# Patient Record
Sex: FEMALE
[De-identification: names, ages, dates, MRNs, and addresses within clinical notes are randomized per-mention and may not be internally consistent; named-entity substitution may affect disease eponyms.]

---

## 2019-03-13 ENCOUNTER — HOSPITAL ENCOUNTER (INPATIENT)
Dept: HOSPITAL 31 - C.ER | Age: 69
LOS: 6 days | Discharge: HOME | DRG: 761 | End: 2019-03-19
Attending: INTERNAL MEDICINE | Admitting: INTERNAL MEDICINE
Payer: MEDICAID

## 2019-03-13 VITALS — BODY MASS INDEX: 28.3 KG/M2
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
## 2019-03-13 DIAGNOSIS — Z79.84: ICD-10-CM

## 2019-03-13 DIAGNOSIS — E78.00: ICD-10-CM

## 2019-03-13 DIAGNOSIS — Z91.83: ICD-10-CM
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
## 2019-03-13 DIAGNOSIS — Z88.0: ICD-10-CM

## 2019-03-13 DIAGNOSIS — Z83.3: ICD-10-CM

## 2019-03-13 DIAGNOSIS — S00.81XA: ICD-10-CM

## 2019-03-13 DIAGNOSIS — F02.80: ICD-10-CM

## 2019-03-13 DIAGNOSIS — Z91.81: ICD-10-CM

## 2019-03-13 DIAGNOSIS — E78.5: ICD-10-CM

## 2019-03-13 DIAGNOSIS — S06.5X9A: Primary | ICD-10-CM
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
## 2019-03-13 DIAGNOSIS — E11.9: ICD-10-CM

## 2019-03-13 DIAGNOSIS — G30.9: ICD-10-CM

## 2019-03-13 DIAGNOSIS — Z86.19: ICD-10-CM

## 2019-03-13 DIAGNOSIS — T68.XXXA: ICD-10-CM

## 2019-03-13 DIAGNOSIS — E89.2: ICD-10-CM

## 2019-03-13 LAB
ALBUMIN SERPL-MCNC: 4.5 G/DL (ref 3.5–5)
ALBUMIN/GLOB SERPL: 1.1 {RATIO} (ref 1–2.1)
ALT SERPL-CCNC: 15 U/L (ref 9–52)
APTT BLD: 27 SECONDS (ref 21–34)
AST SERPL-CCNC: 36 U/L (ref 14–36)
BACTERIA #/AREA URNS HPF: (no result) /[HPF]
BASE EXCESS BLDV CALC-SCNC: -12.4 MMOL/L (ref 0–2)
BASOPHILS # BLD AUTO: 0 K/UL (ref 0–0.2)
BASOPHILS NFR BLD: 0.2 % (ref 0–2)
BILIRUB UR-MCNC: NEGATIVE MG/DL
BUN SERPL-MCNC: 14 MG/DL (ref 7–17)
CALCIUM SERPL-MCNC: 9.8 MG/DL (ref 8.6–10.4)
EOSINOPHIL # BLD AUTO: 0 K/UL (ref 0–0.7)
EOSINOPHIL NFR BLD: 0.2 % (ref 0–4)
ERYTHROCYTE [DISTWIDTH] IN BLOOD BY AUTOMATED COUNT: 15.5 % (ref 11.5–14.5)
GFR NON-AFRICAN AMERICAN: > 60
GLUCOSE UR STRIP-MCNC: NORMAL MG/DL
HGB BLD-MCNC: 13.3 G/DL (ref 11–16)
INR PPP: 1.1
LEUKOCYTE ESTERASE UR-ACNC: (no result) LEU/UL
LIPASE: 121 U/L (ref 23–300)
LYMPHOCYTES # BLD AUTO: 1.9 K/UL (ref 1–4.3)
LYMPHOCYTES NFR BLD AUTO: 12 % (ref 20–40)
MCH RBC QN AUTO: 27.4 PG (ref 27–31)
MCHC RBC AUTO-ENTMCNC: 31.4 G/DL (ref 33–37)
MCV RBC AUTO: 87.2 FL (ref 81–99)
MONOCYTES # BLD: 0.6 K/UL (ref 0–0.8)
MONOCYTES NFR BLD: 3.5 % (ref 0–10)
NEUTROPHILS # BLD: 13.5 K/UL (ref 1.8–7)
NEUTROPHILS NFR BLD AUTO: 84.1 % (ref 50–75)
NRBC BLD AUTO-RTO: 0 % (ref 0–2)
PCO2 BLDV: 31 MMHG (ref 40–60)
PH BLDV: 7.25 [PH] (ref 7.32–7.43)
PH UR STRIP: 5 [PH] (ref 5–8)
PLATELET # BLD: 321 K/UL (ref 130–400)
PMV BLD AUTO: 7.9 FL (ref 7.2–11.7)
PROT UR STRIP-MCNC: NEGATIVE MG/DL
PROTHROMBIN TIME: 11.5 SECONDS (ref 9.7–12.2)
RBC # BLD AUTO: 4.86 MIL/UL (ref 3.8–5.2)
RBC # UR STRIP: NEGATIVE /UL
SP GR UR STRIP: 1.01 (ref 1–1.03)
SQUAMOUS EPITHIAL: < 1 /HPF (ref 0–5)
UROBILINOGEN UR-MCNC: NORMAL MG/DL (ref 0.2–1)
VENOUS BLOOD GAS PO2: 44 MM/HG (ref 30–55)
WBC # BLD AUTO: 16.1 K/UL (ref 4.8–10.8)

## 2019-03-13 RX ADMIN — HUMAN INSULIN SCH: 100 INJECTION, SOLUTION SUBCUTANEOUS at 22:43

## 2019-03-13 NOTE — CP.PCM.PN
Subjective





- Date & Time of Evaluation


Date of Evaluation: 03/13/19


Time of Evaluation: 13:55





- Subjective


Subjective: 





67 yo female with hx dementia


had fall


ct this am showed small amount of blood layoring on right tent and falx


repeat shows no significant change


Pt will not require any surgical intervention 


further manangemtas per neurology





Objective





- Vital Signs/Intake and Output


Vital Signs (last 24 hours): 


                                        











Temp Pulse Resp BP Pulse Ox


 


 97.6 F   83   18   155/67 H  100 


 


 03/13/19 08:24  03/13/19 09:25  03/13/19 09:25  03/13/19 09:25  03/13/19 09:25








Intake and Output: 


                                        











 03/13/19 03/13/19





 06:59 18:59


 


Output Total  380


 


Balance  -380














- Labs


Labs: 


                                        





                                 03/13/19 06:22 





                                 03/13/19 06:22 





                                        











PT  11.5 SECONDS (9.7-12.2)   03/13/19  06:22    


 


INR  1.1   03/13/19  06:22    


 


APTT  27 SECONDS (21-34)   03/13/19  06:22

## 2019-03-13 NOTE — CARD
--------------- APPROVED REPORT --------------





Date of service: 03/13/2019



EKG Measurement

Heart [id number]

NV [id number]

[id number]

[id number]

[id number]



<Conclusion>

Normal sinus rhythm

Moderate voltage criteria for LVH, may be normal variant

ST & T wave abnormality, consider anterolateral ischemia

Prolonged QT

Abnormal ECG

## 2019-03-13 NOTE — CP.PCM.CON
<Alverto Rodarte - Last Filed: 03/13/19 18:24>





History of Present Illness





- History of Present Illness


History of Present Illness: 


Consult Note for ICU for Dr. Gutiérrez





This is a 68 y o female with PMhx hypothyroidism, dementia, HLD, DM2 who 

presented to the ED brought in by police after being found outside near a tree. 

Pt was confused at time of ED presentation, and unable to provide further hx or 

ROS. Per pt's daughter-in-law she states that pt has been living at home with 

pt's daughter and family, and that they were unable to find patient this am. 

Pt's grandson at bedside states that he woke up at 5 am this morning and noticed

that pt had ran away from home. Unknown amount of time pt was outside of house 

before being found. States they went to police station this am to report her 

missing and they were told that she was being treated at [facility]. 

Reports pt has been living at home with them for the past 2 years, having 

previously lived in the [place]. Reports prior times she left home but states they

usually place alarms on to notify if she tries to leave home, but that the 

alarms did not go off this time. Pt on exam currently denies any acute 

complaints except for the skin excoriations sustained on her face that she 

presented with on admission, unknown mechanism of injury. Unable to obtain 

further ROS due to pt's current mental status. Reason for ICU consult was for s

ubdural hematoma. 





PMhx: hypothyroidism, dementia, HLD, DM2


PSurgHx: thyroidectomy < 1 y ago


Allergies: PCN (unknown reaction)


Home meds: reviewed as per MAR


[facility] hx: denies


Soc hx: denies smoking, EtOH or illicit drug use





PMD: Dr. Shanks ([place])





Review of Systems





- Review of Systems


Systems not reviewed;Unavailable: Altered Mental Status





Past Patient History





- Infectious Disease


Hx of Infectious Diseases: None





- Past Medical History & Family History


Past Medical History?: Yes





- Past Social History


Smoking Status: Unknown If Ever Smoked





- CARDIAC


Hx Hypercholesterolemia: Yes





- PULMONARY


Hx Respiratory Disorders: No





- NEUROLOGICAL


Hx Neurological Disorder: Yes


Hx Alzheimer's Disease: Yes


Hx Dementia: Yes





- HEENT


Hx HEENT Problems: No





- RENAL


Hx Chronic Kidney Disease: No





- ENDOCRINE/METABOLIC


Hx Endocrine Disorders: Yes


Hx Diabetes Mellitus Type 2: Yes


Hx Hypothyroidism: Yes





- HEMATOLOGICAL/ONCOLOGICAL


Hx Blood Disorders: No





- INTEGUMENTARY


Hx Dermatological Problems: No





- MUSCULOSKELETAL/RHEUMATOLOGICAL


Hx Musculoskeletal Disorders: No


Hx Falls: Yes


Hx Unsteady Gait: Yes





- GASTROINTESTINAL


Hx Gastrointestinal Disorders: No





- GENITOURINARY/GYNECOLOGICAL


Hx Genitourinary Disorders: No





- PSYCHIATRIC


Hx Psychophysiologic Disorder: No


Hx Substance Use: No





- SURGICAL HISTORY


Hx Surgeries: Yes


Hx Parathyroidectomy: Yes





- ANESTHESIA


Hx Anesthesia: Yes


Hx Anesthesia Reactions: No


Hx Malignant Hyperthermia: No


Has any member of the family had a problem w/ anesthesia?: No





Meds


Allergies/Adverse Reactions: 


                                    Allergies











Allergy/AdvReac Type Severity Reaction Status Date / Time


 


Penicillins Allergy   Verified 03/13/19 06:40














Physical Exam





- Constitutional


Appears: Non-toxic, No Acute Distress





- Head Exam


Head Exam: ATRAUMATIC, NORMOCEPHALIC





- Eye Exam


Eye Exam: EOMI, Normal appearance, PERRL





- ENT Exam


ENT Exam: Mucous Membranes Moist





- Respiratory Exam


Respiratory Exam: Clear to Auscultation Bilateral, NORMAL BREATHING PATTERN.  

absent: Rales, Rhonchi, Wheezes





- Cardiovascular Exam


Cardiovascular Exam: REGULAR RHYTHM, +S1, +S2.  absent: Gallop, Rubs, Systolic 

Murmur





- GI/Abdominal Exam


GI & Abdominal Exam: Normal Bowel Sounds, Soft.  absent: Distended, 

Organomegaly, Tenderness





- Extremities Exam


Extremities exam: Positive for: normal capillary refill, normal inspection, 

pedal pulses present.  Negative for: pedal edema





- Neurological Exam


Neurological exam: Alert


Additional comments: 


AAOx2





- Skin


Skin Exam: Dry, Intact, Warm


Additional comments: 


Excoriations present on face, dry, no active bleeding





Results





- Vital Signs


Recent Vital Signs: 


                                Last Vital Signs











Temp  97.6 F   03/13/19 08:24


 


Pulse  83   03/13/19 09:25


 


Resp  18   03/13/19 09:25


 


BP  155/67 H  03/13/19 09:25


 


Pulse Ox  100   03/13/19 09:25














- Labs


Result Diagrams: 


                                 03/13/19 06:22





                                 03/13/19 06:22


Labs: 


                         Laboratory Results - last 24 hr











  03/13/19 03/13/19 03/13/19





  06:05 06:20 06:22


 


WBC    16.1 H


 


RBC    4.86


 


Hgb    13.3


 


Hct    42.4


 


MCV    87.2


 


MCH    27.4


 


MCHC    31.4 L


 


RDW    15.5 H


 


Plt Count    321


 


MPV    7.9


 


Neut % (Auto)    84.1 H


 


Lymph % (Auto)    12.0 L


 


Mono % (Auto)    3.5


 


Eos % (Auto)    0.2


 


Baso % (Auto)    0.2


 


Neut # (Auto)    13.5 H


 


Lymph # (Auto)    1.9


 


Mono # (Auto)    0.6


 


Eos # (Auto)    0.0


 


Baso # (Auto)    0.0


 


PT   


 


INR   


 


APTT   


 


pO2   44 


 


VBG pH   7.25 L 


 


VBG pCO2   31 L 


 


VBG HCO3   14.5 


 


VBG Total CO2   14.6 L 


 


VBG O2 Sat (Calc)   79.7 H 


 


VBG Base Excess   -12.4 L 


 


VBG Potassium   2.4 L* 


 


Sodium   143.0 


 


Chloride   121.0 H 


 


Glucose   104 


 


Lactate   3.6 H 


 


Blood Gas Comments   K value 2.4 


 


Crit Value Called To    


 


Crit Value Called By   Araon yip 


 


Crit Value Read Back   Y 


 


Blood Gas Notified Time   632 


 


Potassium   


 


Carbon Dioxide   


 


Anion Gap   


 


BUN   


 


Creatinine   


 


Est GFR ( Amer)   


 


Est GFR (Non-Af Amer)   


 


POC Glucose (mg/dL)  164 H  


 


Random Glucose   


 


Calcium   


 


Magnesium   


 


Total Bilirubin   


 


AST   


 


ALT   


 


Alkaline Phosphatase   


 


Total Protein   


 


Albumin   


 


Globulin   


 


Albumin/Globulin Ratio   


 


Lipase   


 


Venous Blood Potassium   2.4 L* 


 


Urine Color   


 


Urine Clarity   


 


Urine pH   


 


Ur Specific Gravity   


 


Urine Protein   


 


Urine Glucose (UA)   


 


Urine Ketones   


 


Urine Blood   


 


Urine Nitrate   


 


Urine Bilirubin   


 


Urine Urobilinogen   


 


Ur Leukocyte Esterase   


 


Urine WBC (Auto)   


 


Urine RBC (Auto)   


 


Ur Squamous Epith Cells   


 


Urine Bacteria   














  03/13/19 03/13/19 03/13/19





  06:22 06:22 10:12


 


WBC   


 


RBC   


 


Hgb   


 


Hct   


 


MCV   


 


MCH   


 


MCHC   


 


RDW   


 


Plt Count   


 


MPV   


 


Neut % (Auto)   


 


Lymph % (Auto)   


 


Mono % (Auto)   


 


Eos % (Auto)   


 


Baso % (Auto)   


 


Neut # (Auto)   


 


Lymph # (Auto)   


 


Mono # (Auto)   


 


Eos # (Auto)   


 


Baso # (Auto)   


 


PT  11.5  


 


INR  1.1  


 


APTT  27  


 


pO2   


 


VBG pH   


 


VBG pCO2   


 


VBG HCO3   


 


VBG Total CO2   


 


VBG O2 Sat (Calc)   


 


VBG Base Excess   


 


VBG Potassium   


 


Sodium   140 


 


Chloride   104 


 


Glucose   


 


Lactate   


 


Blood Gas Comments   


 


Crit Value Called To   


 


Crit Value Called By   


 


Crit Value Read Back   


 


Blood Gas Notified Time   


 


Potassium   4.5 


 


Carbon Dioxide   23 


 


Anion Gap   17 


 


BUN   14 


 


Creatinine   0.7 


 


Est GFR ( Amer)   > 60 


 


Est GFR (Non-Af Amer)   > 60 


 


POC Glucose (mg/dL)   


 


Random Glucose   162 H 


 


Calcium   9.8 


 


Magnesium   1.8 


 


Total Bilirubin   0.5 


 


AST   36 


 


ALT   15 


 


Alkaline Phosphatase   175 H 


 


Total Protein   8.4 H 


 


Albumin   4.5 


 


Globulin   4.0 H 


 


Albumin/Globulin Ratio   1.1 


 


Lipase   121 


 


Venous Blood Potassium   


 


Urine Color    Yellow


 


Urine Clarity    Clear


 


Urine pH    5.0


 


Ur Specific Gravity    1.009


 


Urine Protein    Negative


 


Urine Glucose (UA)    Normal


 


Urine Ketones    Negative


 


Urine Blood    Negative


 


Urine Nitrate    Positive H


 


Urine Bilirubin    Negative


 


Urine Urobilinogen    Normal


 


Ur Leukocyte Esterase    Trace


 


Urine WBC (Auto)    3


 


Urine RBC (Auto)    1


 


Ur Squamous Epith Cells    < 1


 


Urine Bacteria    Occ H














Assessment & Plan





- Assessment and Plan (Free Text)


Assessment: 


This is a 68 y o female with PMhx hypothyroidism, dementia, HLD, DM2 who 

presented to the ED brought in by police after being found outside near a tree. 

Reason for ICU consult was for subdural hematoma. 


Plan: 


Neuro:


-AAOx2


-CT head on admission: acute subdural hematoma along R tentorial leaflet with 

maximum thickness of 0.2 cm.


-Repeat head CT: interval slight increased the size of the acute subdural 

hematoma along the R tentorial leaflet since the prior exam. Otherwise no 

significant interval changes


-Neurosurgery consulted (Dr. Pierce), recs appreciated


   No surgical intervention recommended at this time


-Neurology consulted (Dr. Zhang), recs appreciated


-Hx dementia, cont w/ home med Donepezil





Cardio: 


-HR and BP wnl, pt asymptomatic, cont to monitor


-C/w home med Lisinopril daily


-C/w home med statin therapy


-EKG on admission demonstrates NSR, possible LVH, prolonged QT


-CXR on admission: no focal consolidation





GI:


-Diabetic diet


-Protonix daily


-No acute issues at this time





Renal:


-BUN/Cr 14/0.7, cont to monitor


-Trend I's/O's





ID:


-Leukocytosis 16.1


-Afebrile, no tachycardia


-Cont to trend, no anbx indicated at this time


-Blood and urine cxs pending


-CXR neg for consolidation





Endo:


-Hx DM2


   Cont with home med Metformin


   ISS-med


   Fingersticks achs


   Hypoglycemic protocol


   A1c pending


-Hx hypothyroidism s/p thyroidectomy


   C/w home med Synthroid


   TSH, free T3 and T4 pending





PPX:


-Protonix


-SCD, pharmacologic DVT ppx contraindicated 2/2 cranial bleed


-OT/PT evals ordered





Pt seen, examined with, and plan discussed with Dr. Gutiérrez, attending physician.





Alverto Rodarte DO PGY-1, Family Medicine Resident 


Pager #[phone]





<HenrikRobert dillon - Last Filed: 03/19/19 23:08>





Results





- Vital Signs


Recent Vital Signs: 


                                Last Vital Signs











Temp  97.1 F L  03/19/19 07:00


 


Pulse  63   03/19/19 07:00


 


Resp  20   03/19/19 07:00


 


BP  102/61   03/19/19 07:00


 


Pulse Ox  97   03/19/19 07:00














- Labs


Result Diagrams: 


                                 03/18/19 07:15





                                 03/18/19 07:15


Labs: 


                         Laboratory Results - last 24 hr











  03/19/19 03/19/19





  07:02 11:08


 


POC Glucose (mg/dL)  105  108














Attending/Attestation





- Attestation


I have personally seen and examined this patient.: Yes


I have fully participated in the care of the patient.: Yes


I have reviewed all pertinent clinical information: Yes


Notes (Text): 








03/13/19 


Today: Wednesday, March 13, 2019





The Patient was seen and examined at the bedside, Medical records reviewed, and 

management issues were discussed and formulated with the house staff.


I have reviewed all the relevant clinical, laboratory, hemodynamic, radiographic

data and medications


Events reviewed


Pain issues, skin care, head of the bed elevation, glycemic control were 

addressed.


Agree with above resident's assessment and treatment plans of care as 

transcribed in Dr. Rodarte's note.

## 2019-03-13 NOTE — CP.PCM.HP
Past Patient History





- Infectious Disease


Hx of Infectious Diseases: None





- Past Medical History & Family History


Past Medical History?: Yes





- Past Social History


Smoking Status: Unknown If Ever Smoked





- CARDIAC


Hx Hypercholesterolemia: Yes





- PULMONARY


Hx Respiratory Disorders: No





- NEUROLOGICAL


Hx Neurological Disorder: Yes


Hx Alzheimer's Disease: Yes


Hx Dementia: Yes





- HEENT


Hx HEENT Problems: No





- RENAL


Hx Chronic Kidney Disease: No





- ENDOCRINE/METABOLIC


Hx Endocrine Disorders: Yes


Hx Diabetes Mellitus Type 2: Yes


Hx Hypothyroidism: Yes





- HEMATOLOGICAL/ONCOLOGICAL


Hx Blood Disorders: No





- INTEGUMENTARY


Hx Dermatological Problems: No





- MUSCULOSKELETAL/RHEUMATOLOGICAL


Hx Musculoskeletal Disorders: No


Hx Falls: Yes


Hx Unsteady Gait: Yes





- GASTROINTESTINAL


Hx Gastrointestinal Disorders: No





- GENITOURINARY/GYNECOLOGICAL


Hx Genitourinary Disorders: No





- PSYCHIATRIC


Hx Psychophysiologic Disorder: No


Hx Substance Use: No





- SURGICAL HISTORY


Hx Surgeries: Yes


Hx Parathyroidectomy: Yes





- ANESTHESIA


Hx Anesthesia: Yes


Hx Anesthesia Reactions: No


Hx Malignant Hyperthermia: No


Has any member of the family had a problem w/ anesthesia?: No





Meds


Allergies/Adverse Reactions: 


                                    Allergies











Allergy/AdvReac Type Severity Reaction Status Date / Time


 


Penicillins Allergy   Verified 03/13/19 06:40














Physical Exam





- Constitutional


Appears: Well





- Head Exam


Head Exam: ATRAUMATIC, NORMAL INSPECTION, NORMOCEPHALIC





- Eye Exam


Eye Exam: EOMI, Normal appearance, PERRL


Pupil Exam: NORMAL ACCOMODATION, PERRL





- ENT Exam


ENT Exam: Mucous Membranes Moist, Normal Exam





- Neck Exam


Neck exam: Positive for: Normal Inspection





- Respiratory Exam


Respiratory Exam: Decreased Breath Sounds





- Cardiovascular Exam


Cardiovascular Exam: REGULAR RHYTHM, +S1, +S2





- GI/Abdominal Exam


GI & Abdominal Exam: Diminished Bowel Sounds, Soft





- Rectal Exam


Rectal Exam: Deferred





Results





- Vital Signs


Recent Vital Signs: 





                                Last Vital Signs











Temp  99.4 F   03/13/19 16:00


 


Pulse  88   03/13/19 17:57


 


Resp  20   03/13/19 17:57


 


BP  111/39 L  03/13/19 17:57


 


Pulse Ox  95   03/13/19 17:57














- Labs


Result Diagrams: 


                                 03/13/19 06:22





                                 03/13/19 06:22


Labs: 





                         Laboratory Results - last 24 hr











  03/13/19 03/13/19 03/13/19





  06:05 06:20 06:22


 


WBC    16.1 H


 


RBC    4.86


 


Hgb    13.3


 


Hct    42.4


 


MCV    87.2


 


MCH    27.4


 


MCHC    31.4 L


 


RDW    15.5 H


 


Plt Count    321


 


MPV    7.9


 


Neut % (Auto)    84.1 H


 


Lymph % (Auto)    12.0 L


 


Mono % (Auto)    3.5


 


Eos % (Auto)    0.2


 


Baso % (Auto)    0.2


 


Neut # (Auto)    13.5 H


 


Lymph # (Auto)    1.9


 


Mono # (Auto)    0.6


 


Eos # (Auto)    0.0


 


Baso # (Auto)    0.0


 


PT   


 


INR   


 


APTT   


 


pO2   44 


 


VBG pH   7.25 L 


 


VBG pCO2   31 L 


 


VBG HCO3   14.5 


 


VBG Total CO2   14.6 L 


 


VBG O2 Sat (Calc)   79.7 H 


 


VBG Base Excess   -12.4 L 


 


VBG Potassium   2.4 L* 


 


Sodium   143.0 


 


Chloride   121.0 H 


 


Glucose   104 


 


Lactate   3.6 H 


 


Blood Gas Comments   K value 2.4 


 


Crit Value Called To    


 


Crit Value Called By   Aaron yip 


 


Crit Value Read Back   Y 


 


Blood Gas Notified Time   632 


 


Potassium   


 


Carbon Dioxide   


 


Anion Gap   


 


BUN   


 


Creatinine   


 


Est GFR ( Amer)   


 


Est GFR (Non-Af Amer)   


 


POC Glucose (mg/dL)  164 H  


 


Random Glucose   


 


Calcium   


 


Magnesium   


 


Total Bilirubin   


 


AST   


 


ALT   


 


Alkaline Phosphatase   


 


Total Protein   


 


Albumin   


 


Globulin   


 


Albumin/Globulin Ratio   


 


Lipase   


 


Venous Blood Potassium   2.4 L* 


 


Urine Color   


 


Urine Clarity   


 


Urine pH   


 


Ur Specific Gravity   


 


Urine Protein   


 


Urine Glucose (UA)   


 


Urine Ketones   


 


Urine Blood   


 


Urine Nitrate   


 


Urine Bilirubin   


 


Urine Urobilinogen   


 


Ur Leukocyte Esterase   


 


Urine WBC (Auto)   


 


Urine RBC (Auto)   


 


Ur Squamous Epith Cells   


 


Urine Bacteria   














  03/13/19 03/13/19 03/13/19





  06:22 06:22 10:12


 


WBC   


 


RBC   


 


Hgb   


 


Hct   


 


MCV   


 


MCH   


 


MCHC   


 


RDW   


 


Plt Count   


 


MPV   


 


Neut % (Auto)   


 


Lymph % (Auto)   


 


Mono % (Auto)   


 


Eos % (Auto)   


 


Baso % (Auto)   


 


Neut # (Auto)   


 


Lymph # (Auto)   


 


Mono # (Auto)   


 


Eos # (Auto)   


 


Baso # (Auto)   


 


PT  11.5  


 


INR  1.1  


 


APTT  27  


 


pO2   


 


VBG pH   


 


VBG pCO2   


 


VBG HCO3   


 


VBG Total CO2   


 


VBG O2 Sat (Calc)   


 


VBG Base Excess   


 


VBG Potassium   


 


Sodium   140 


 


Chloride   104 


 


Glucose   


 


Lactate   


 


Blood Gas Comments   


 


Crit Value Called To   


 


Crit Value Called By   


 


Crit Value Read Back   


 


Blood Gas Notified Time   


 


Potassium   4.5 


 


Carbon Dioxide   23 


 


Anion Gap   17 


 


BUN   14 


 


Creatinine   0.7 


 


Est GFR ( Amer)   > 60 


 


Est GFR (Non-Af Amer)   > 60 


 


POC Glucose (mg/dL)   


 


Random Glucose   162 H 


 


Calcium   9.8 


 


Magnesium   1.8 


 


Total Bilirubin   0.5 


 


AST   36 


 


ALT   15 


 


Alkaline Phosphatase   175 H 


 


Total Protein   8.4 H 


 


Albumin   4.5 


 


Globulin   4.0 H 


 


Albumin/Globulin Ratio   1.1 


 


Lipase   121 


 


Venous Blood Potassium   


 


Urine Color    Yellow


 


Urine Clarity    Clear


 


Urine pH    5.0


 


Ur Specific Gravity    1.009


 


Urine Protein    Negative


 


Urine Glucose (UA)    Normal


 


Urine Ketones    Negative


 


Urine Blood    Negative


 


Urine Nitrate    Positive H


 


Urine Bilirubin    Negative


 


Urine Urobilinogen    Normal


 


Ur Leukocyte Esterase    Trace


 


Urine WBC (Auto)    3


 


Urine RBC (Auto)    1


 


Ur Squamous Epith Cells    < 1


 


Urine Bacteria    Occ H

## 2019-03-13 NOTE — CT
Date of service: 



03/13/2019



PROCEDURE:  CT HEAD WITHOUT CONTRAST.



HISTORY:

R/O Bleed



COMPARISON:

None available.



TECHNIQUE:

Axial computed tomography images were obtained through the head/brain 

without intravenous contrast.  



Radiation dose:



Total exam DLP = 1061.42 mGy-cm.



This CT exam was performed using one or more of the following dose 

reduction techniques: Automated exposure control, adjustment of the 

mA and/or kV according to patient size, and/or use of iterative 

reconstruction technique.



FINDINGS:



HEMORRHAGE:

There is acute subdural hematoma along the right tentorial leaflet 

with maximum thickness of 0.2 centimeter. 



BRAIN:

No mass effect or edema.  Mild volume loss and mild-to-moderate 

chronic microvascular white matter ischemic changes are noted.



VENTRICLES:

The ventricles are mildly dilated. 



CALVARIUM:

Unremarkable.



PARANASAL SINUSES:

Unremarkable as visualized. No significant inflammatory changes.



MASTOID AIR CELLS:

Unremarkable as visualized. No inflammatory changes.



OTHER FINDINGS:

None.



IMPRESSION:

Acute subdural hematoma along the right tentorial leaflet with 

maximum thickness of 0.2 centimeter.  



Mild volume loss and mild chronic microvascular white matter ischemic 

changes.



Preliminary report contains concordant findings was submitted by [facility] 

Radiology.

## 2019-03-13 NOTE — CT
Date of service: 



03/13/2019



PROCEDURE:  CT ORBITS WITHOUT CONTRAST.



HISTORY:

? fall



COMPARISON:

None available.



TECHNIQUE:

Axial CT images of the orbits were obtained. Coronal and sagittal 

reformats were generated.



Radiation dose:



Total exam DLP = 748.24 mGy-cm.



This CT exam was performed using one or more of the following dose 

reduction techniques: Automated exposure control, adjustment of the 

mA and/or kV according to patient size, and/or use of iterative 

reconstruction technique.



FINDINGS:



RIGHT ORBIT:



RIGHT BONY ORBIT:

Normal.



RIGHT INTRAORBITAL STRUCTURES:

Globe: Normal.



Extraocular muscles: Normal.



Post septal space: Normal.



Optic Nerve: Normal.



Lacrimal Apparatus: Normal.



RIGHT PRESEPTAL SOFT TISSUES:

Mild to moderate right periorbital soft tissue swelling and preseptal 

soft tissue edema and posttraumatic changes.



LEFT ORBIT:



LEFT BONY ORBIT:

Normal.



LEFT INTRAORBITAL STRUCTURES:

Globe: Normal.



Extraocular muscles: Normal.



Post septal space: Normal



Optic Nerve: Normal. .



Lacrimal Apparatus: Normal.



LEFT PRESEPTAL SOFT TISSUES:

Normal.



OTHER:

Age indeterminate nasal bone fracture may be old.



IMPRESSION:

No evidence of orbital fracture.



Right periorbital and preseptal soft tissue swelling.



Preliminary report contains concordant findings was submitted by [facility] 

Radiology.

## 2019-03-13 NOTE — CT
Date of service: 



03/13/2019



PROCEDURE:  CT Cervical Spine without contrast



HISTORY:

fall



COMPARISON:

None available.



TECHNIQUE:

Axial computed tomography images were obtained of the cervical spine 

without the use of intravenous contrast. Coronal and sagittal 

reformatted images were created and reviewed.  3D images of the 

cervical spine were also obtained. 



Radiation dose:



Total exam DLP = 590.46 mGy-cm.



This CT exam was performed using one or more of the following dose 

reduction techniques: Automated exposure control, adjustment of the 

mA and/or kV according to patient size, and/or use of iterative 

reconstruction technique.



FINDINGS:



VERTEBRAE:

No fracture. Normal alignment. No destructive bony lesion.



DISCS/SPINAL CANAL/NEURAL FORAMINA:

Mild spondylosis noted at the mid and lower cervical spine more 

prominent at C5-C6.  At C5-C6 there is osteophyte bulging disc 

associated with mild spinal stenosis.  Mild intervertebral disc 

spaces narrowing noted at C5-C6 and C6-C7.



PARASPINAL SOFT TISSUES:

Unremarkable. 



OTHER FINDINGS:

Small left mastoid effusion is noted.



IMPRESSION:

No CT evidence of acute displaced fracture or acute traumatic 

subluxation in the cervical spine.



Mild spondylosis more prominent at C5-C6.

## 2019-03-13 NOTE — C.PDOC
History Of Present Illness





pt found outside near a tree. Pt is confused and does not know Unable to obtain 

any history from pt. how she got there. police present


Time Seen by Provider: 03/13/19 05:54


History Per: Other (police, ems)


History/Exam Limitations: clinical condition


Onset/Duration Of Symptoms: Unknown


Current Symptoms Are (Timing): Still Present


Severity: Severe


Pain Scale Rating Of: 8





Past Medical History


Reviewed: Historical Data, Nursing Documentation, Vital Signs


Family History: States: No Known Family Hx





Review Of Systems


Review Of Systems: ROS cannot be obtained secondary to pt's inabilty to answer 

questions.





Physical Exam





- Physical Exam


Appears: Non-toxic


Skin: Other (colf, abrasions, nose, forehead, right cheek)


Head: Tenderness (mild, no crepitus)


Eye(s): bilateral: Normal Inspection, EOMI


Ear(s): Bilateral: Normal


Nose: No Septal Hematoma


Oral Mucosa: Dry


Tongue: Normal Appearing


Lips: Swelling, Abrasion


Neck: No Step Off Deformity, Supple


Chest: Symmetrical


Cardiovascular: Rhythm Regular


Respiratory: No Rales, No Rhonchi, No Wheezing


Gastrointestinal/Abdominal: Soft, No Tenderness, No Distention


Back: No CVA Tenderness


Extremity: Normal ROM, Other (abrasions both knees)


Extremity: Bilateral: Normal ROM


Pulses: Left Dorsalis Pedis: Normal, Right Dorsalis Pedis: Normal


Neurological/Psych: Other (aaox2, confused, moves all extremities)


Disoriented To: Person, Place


Gait: Unable To Assess





ED Course And Treatment





- Laboratory Results


Result Diagrams: 


                                 03/13/19 06:22





                                 03/13/19 06:22


ECG: Interpreted By Me, Viewed By Me


O2 Sat by Pulse Oximetry: 98


Pulse Ox Interpretation: Normal





- Radiology


CXR: Interpreted by Me, Viewed By Me


CXR Interpretation: No: Infiltrates, Fracture, Pnemothorax


Progress Note: placed pt on a deanne blankest as rectal temp is 91F





Critical Care Time





- Critical Care Note


Total Time (in mins): 30


Documented critical care: time excludes all time spent performing seperately 

billable procedures.





Disposition


Counseled Patient/Family Regarding: Studies Performed, Diagnosis





- Disposition


Disposition Time: 07:00


Condition: FAIR





- Clinical Impression


Clinical Impression: 


 Fall, Hypothermia








Physician Patient Turnover


Patient Signed Over To: Eugenio Bacon


Handoff Comments: pending labs, ct scans and dispo

## 2019-03-13 NOTE — RAD
HISTORY:

 SOB 



COMPARISON:

None available. 



TECHNIQUE:

Chest, one view.



FINDINGS:

Examination limited by habitus and hypoinflation.  Patient's chin 

obscures evaluation of the lung apices. 



LUNGS:

No focal consolidation.



Please note that chest x-ray has limited sensitivity for the 

detection of pulmonary masses.



PLEURA:

No significant pleural effusion identified. No definite pneumothorax .



CARDIOVASCULAR:

Heart size appears top normal.  Atherosclerotic calcifications of the 

aorta. 



OSSEOUS STRUCTURES:

Osseous demineralization.  Degenerative changes.



VISUALIZED UPPER ABDOMEN:

Unremarkable.



OTHER FINDINGS:

None.



IMPRESSION:

No focal consolidation.

## 2019-03-13 NOTE — CT
Date of service: 



03/13/2019



PROCEDURE:  CT HEAD WITHOUT CONTRAST.



HISTORY:

eval for interval change



COMPARISON:

Comparison is made with the previous same-day exam.



TECHNIQUE:

Axial computed tomography images were obtained through the head/brain 

without intravenous contrast.  



Radiation dose:



Total exam DLP = 998.66 mGy-cm.



This CT exam was performed using one or more of the following dose 

reduction techniques: Automated exposure control, adjustment of the 

mA and/or kV according to patient size, and/or use of iterative 

reconstruction technique.



FINDINGS:



HEMORRHAGE:

Interval slight increase in the size of the previously seen acute 

subdural hematoma along the right tentorial leaflet since the prior 

study.  The maximum thickness of the subdural hematoma is 4 

millimeter in the current exam.  No evidence of other intracranial 

hemorrhage. 



BRAIN:

No mass effect or edema.  Mild atrophy and chronic microvascular 

white matter ischemic changes are again noted.



VENTRICLES:

Unremarkable. No hydrocephalus. 



CALVARIUM:

Unremarkable.



PARANASAL SINUSES:

Unremarkable as visualized. No significant inflammatory changes.



MASTOID AIR CELLS:

Unremarkable as visualized. No inflammatory changes.



OTHER FINDINGS:

None.



IMPRESSION:

Interval slight increased the size of the acute subdural hematoma 

along the right tentorial leaflet since the prior exam.  Otherwise no 

significant interval changes

## 2019-03-14 LAB
ALBUMIN SERPL-MCNC: 3.7 G/DL (ref 3.5–5)
ALBUMIN/GLOB SERPL: 1 {RATIO} (ref 1–2.1)
ALT SERPL-CCNC: 24 U/L (ref 9–52)
AST SERPL-CCNC: 36 U/L (ref 14–36)
BASOPHILS # BLD AUTO: 0 K/UL (ref 0–0.2)
BASOPHILS NFR BLD: 0.7 % (ref 0–2)
BUN SERPL-MCNC: 11 MG/DL (ref 7–17)
CALCIUM SERPL-MCNC: 9.2 MG/DL (ref 8.6–10.4)
EOSINOPHIL # BLD AUTO: 0.1 K/UL (ref 0–0.7)
EOSINOPHIL NFR BLD: 1.2 % (ref 0–4)
ERYTHROCYTE [DISTWIDTH] IN BLOOD BY AUTOMATED COUNT: 15.2 % (ref 11.5–14.5)
GFR NON-AFRICAN AMERICAN: > 60
HGB BLD-MCNC: 12 G/DL (ref 11–16)
LYMPHOCYTES # BLD AUTO: 1.9 K/UL (ref 1–4.3)
LYMPHOCYTES NFR BLD AUTO: 31.7 % (ref 20–40)
MCH RBC QN AUTO: 27.9 PG (ref 27–31)
MCHC RBC AUTO-ENTMCNC: 32.5 G/DL (ref 33–37)
MCV RBC AUTO: 85.9 FL (ref 81–99)
MONOCYTES # BLD: 0.5 K/UL (ref 0–0.8)
MONOCYTES NFR BLD: 7.8 % (ref 0–10)
NEUTROPHILS # BLD: 3.5 K/UL (ref 1.8–7)
NEUTROPHILS NFR BLD AUTO: 58.6 % (ref 50–75)
NRBC BLD AUTO-RTO: 0 % (ref 0–2)
PLATELET # BLD: 253 K/UL (ref 130–400)
PMV BLD AUTO: 8 FL (ref 7.2–11.7)
RBC # BLD AUTO: 4.3 MIL/UL (ref 3.8–5.2)
WBC # BLD AUTO: 5.9 K/UL (ref 4.8–10.8)

## 2019-03-14 RX ADMIN — HUMAN INSULIN SCH: 100 INJECTION, SOLUTION SUBCUTANEOUS at 16:30

## 2019-03-14 RX ADMIN — BACITRACIN ZINC PRN EA: 500 OINTMENT TOPICAL at 17:37

## 2019-03-14 RX ADMIN — HUMAN INSULIN SCH: 100 INJECTION, SOLUTION SUBCUTANEOUS at 11:30

## 2019-03-14 RX ADMIN — HUMAN INSULIN SCH: 100 INJECTION, SOLUTION SUBCUTANEOUS at 08:25

## 2019-03-14 RX ADMIN — HUMAN INSULIN SCH: 100 INJECTION, SOLUTION SUBCUTANEOUS at 22:21

## 2019-03-14 RX ADMIN — PANTOPRAZOLE SODIUM SCH MG: 40 TABLET, DELAYED RELEASE ORAL at 10:00

## 2019-03-14 NOTE — CT
Date of service: 



03/14/2019



PROCEDURE:  CT HEAD WITHOUT CONTRAST.



HISTORY:

eval for interval change



COMPARISON:

03/13/2019



TECHNIQUE:

Axial computed tomography images were obtained through the head/brain 

without intravenous contrast.  



Radiation dose:



Total exam DLP = 962.86 mGy-cm.



This CT exam was performed using one or more of the following dose 

reduction techniques: Automated exposure control, adjustment of the 

mA and/or kV according to patient size, and/or use of iterative 

reconstruction technique.



FINDINGS:



HEMORRHAGE:

Stable subdural hematoma along the right tentorial leaflet. 



BRAIN:

No mass effect or edema.  No atrophy or chronic microvascular 

ischemic changes.



VENTRICLES:

Unremarkable. No hydrocephalus. 



CALVARIUM:

Unremarkable.



PARANASAL SINUSES:

Unremarkable as visualized. No significant inflammatory changes.



MASTOID AIR CELLS:

Unremarkable as visualized. No inflammatory changes.



OTHER FINDINGS:

None.



IMPRESSION:

Stable subdural hematoma along the right tentorial leaflet.

## 2019-03-14 NOTE — CP.CCUPN
CCU Subjective





- Physician Review


Subjective (Free Text): 


ICU Progress Note for Dr. Shepherd





Pt seen and examined at bedside this am. Denies any acute complaints. Unable to 

obtain ROS due to pt's current mental status. No acute events reported overnight

by staff. Repeat CT head ordered for this am.














CCU Objective





- Vital Signs / Intake & Output


Vital Signs (Last 4 hours): 


Vital Signs











  Temp Pulse Resp BP Pulse Ox


 


 03/14/19 08:00  98.1 F  57 L  17   100


 


 03/14/19 07:57   57 L  19  128/48 L  97


 


 03/14/19 07:00   54 L  15   100


 


 03/14/19 06:58   56 L  17  113/63  100


 


 03/14/19 06:57   57 L  17   100


 


 03/14/19 06:00   59 L  16   100


 


 03/14/19 05:57     144/59 L 











Intake and Output (Last 8hrs): 


                                 Intake & Output











 03/13/19 03/14/19 03/14/19





 22:59 06:59 14:59


 


Intake Total 290 350 120


 


Output Total 575 350 0


 


Balance -285 0 120


 


Intake:   


 


  Intake, IV Amount 0 350 


 


    Left Antecubital 0 0 


 


    Right Forearm 0 350 


 


  Oral 290  120


 


Output:   


 


  Urine 575 350 0


 


    Urethral (Singh) 575  


 


    Urine, Voided 0 350 0


 


Other:   


 


  # Voids   


 


    Urine, Voided  1 














- Physical Exam


Head: Positive for: Atraumatic, Normocephalic, Abrasion (Abrasion noted to face 

around nares and forehead, no active bleeding)


Pupils: Positive for: PERRL


Extroacular Muscles: Positive for: EOMI


Conjunctiva: Positive for: Normal


Mouth: Positive for: Moist Mucous Membranes


Neck: Positive for: Normal Range of Motion.  Negative for: JVD, Lymphadenopathy


Respiratory/Chest: Positive for: Clear to Auscultation, Good Air Exchange.  

Negative for: Respiratory Distress, Accessory Muscle Use, Wheezes, Rales, R

etracting


Cardiovascular: Positive for: Regular Rate and Rhythm, Normal S1, S2.  Negative 

for: Murmurs, Rub, Gallop


Abdomen: Positive for: Normal Bowel Sounds.  Negative for: Tenderness, 

Distention, Mass/Organomegaly


Upper Extremity: Positive for: Normal Inspection, NORMAL PULSES, Neurovascularly

Intact, Capillary Refill < 2s.  Negative for: Cyanosis, Edema


Lower Extremity: Positive for: Normal Inspection, NORMAL PULSES, Neurovascularly

Intact, Capillary Refill < 2 s.  Negative for: Edema


Neurological: Positive for: GCS=15, CN II-XII Intact


Skin: Positive for: Warm, Dry, Normal Color


Psychiatric: Positive for: Alert, Oriented x 3





- Medications


Active Medications: 


Active Medications











Generic Name Dose Route Start Last Admin





  Trade Name Freq  PRN Reason Stop Dose Admin


 


Donepezil HCl  10 mg  03/13/19 22:00  03/13/19 22:22





  Aricept  PO   10 mg





  HS AMALIA   Administration





     





     





     





     


 


Insulin Human Regular  0 unit  03/13/19 22:00  03/14/19 08:25





  Novolin R  SC   Not Given





  [facility]   





     





     





  Protocol   





     


 


Levothyroxine Sodium  125 mcg  03/14/19 06:30  03/14/19 08:25





  Synthroid  PO   125 mcg





  DAILY@0630 AMALIA   Administration





     





     





     





     


 


Lisinopril  2.5 mg  03/13/19 17:00  03/13/19 18:11





  Zestril  PO   Not Given





  DAILY AMALIA   





     





     





     





     


 


Metformin HCl  500 mg  03/13/19 17:00  





  Glucophage  PO   





  DIN AMALIA   





     





     





     





     


 


Pantoprazole Sodium  40 mg  03/14/19 10:00  





  Protonix Ec Tab  PO   





  DAILY AMALIA   





     





     





     





     


 


Rosuvastatin Calcium  20 mg  03/13/19 22:00  03/13/19 22:22





  Crestor  PO   20 mg





  HS AMALIA   Administration





     





     





     





     














- Patient Studies


Lab Studies: 


                              Microbiology Studies











 03/13/19 07:18 Blood Culture - Preliminary





 Blood    NO GROWTH AFTER 24 HOURS


 


 03/13/19 07:18 Blood Culture - Preliminary





 Blood    NO GROWTH AFTER 24 HOURS








                                   Lab Studies











  03/14/19 03/14/19 03/14/19 Range/Units





  06:19 06:19 06:19 


 


WBC     (4.8-10.8)  K/uL


 


RBC     (3.80-5.20)  Mil/uL


 


Hgb     (11.0-16.0)  g/dL


 


Hct     (34.0-47.0)  %


 


MCV     (81.0-99.0)  fL


 


MCH     (27.0-31.0)  pg


 


MCHC     (33.0-37.0)  g/dL


 


RDW     (11.5-14.5)  %


 


Plt Count     (130-400)  K/uL


 


MPV     (7.2-11.7)  fL


 


Neut % (Auto)     (50.0-75.0)  %


 


Lymph % (Auto)     (20.0-40.0)  %


 


Mono % (Auto)     (0.0-10.0)  %


 


Eos % (Auto)     (0.0-4.0)  %


 


Baso % (Auto)     (0.0-2.0)  %


 


Neut # (Auto)     (1.8-7.0)  K/uL


 


Lymph # (Auto)     (1.0-4.3)  K/uL


 


Mono # (Auto)     (0.0-0.8)  K/uL


 


Eos # (Auto)     (0.0-0.7)  K/uL


 


Baso # (Auto)     (0.0-0.2)  K/uL


 


Sodium    137  (132-148)  mmol/L


 


Potassium    3.6  (3.6-5.2)  mmol/L


 


Chloride    106  ()  mmol/L


 


Carbon Dioxide    27  (22-30)  mmol/L


 


Anion Gap    7 L  (10-20)  


 


BUN    11  (7-17)  mg/dL


 


Creatinine    0.7  (0.7-1.2)  mg/dL


 


Est GFR (African Amer)    > 60  


 


Est GFR (Non-Af Amer)    > 60  


 


Random Glucose    106 H D  ()  mg/dL


 


Hemoglobin A1c  6.4    (4.2-6.5)  %


 


Calcium    9.2  (8.6-10.4)  mg/dl


 


Phosphorus    4.3  (2.5-4.5)  mg/dL


 


Magnesium    2.0  (1.6-2.3)  mg/dL


 


Total Bilirubin    0.9  (0.2-1.3)  mg/dL


 


AST    36  (14-36)  U/L


 


ALT    24  (9-52)  U/L


 


Alkaline Phosphatase    134 H D  ()  U/L


 


Total Protein    7.3  (6.3-8.3)  g/dL


 


Albumin    3.7  (3.5-5.0)  g/dL


 


Globulin    3.6  (2.2-3.9)  gm/dL


 


Albumin/Globulin Ratio    1.0  (1.0-2.1)  


 


Free T4   1.56   (0.78-2.19)  ng/dL


 


Free T3 pg/mL    3.33  (2.77-5.27)  pg/mL


 


TSH 3rd Generation    0.11 L  (0.46-4.68)  mIU/L


 


Urine Color     (YELLOW)  


 


Urine Clarity     (Clear)  


 


Urine pH     (5.0-8.0)  


 


Ur Specific Gravity     (1.003-1.030)  


 


Urine Protein     (NEGATIVE)  mg/dL


 


Urine Glucose (UA)     (Normal)  mg/dL


 


Urine Ketones     (NEGATIVE)  mg/dL


 


Urine Blood     (NEGATIVE)  


 


Urine Nitrate     (NEGATIVE)  


 


Urine Bilirubin     (NEGATIVE)  


 


Urine Urobilinogen     (0.2-1.0)  mg/dL


 


Ur Leukocyte Esterase     (Negative)  Juan/uL


 


Urine WBC (Auto)     (0-5)  /hpf


 


Urine RBC (Auto)     (0-3)  /hpf


 


Ur Squamous Epith Cells     (0-5)  /hpf


 


Urine Bacteria     (<OCC)  














  03/14/19 03/13/19 Range/Units





  06:19 10:12 


 


WBC  5.9  D   (4.8-10.8)  K/uL


 


RBC  4.30   (3.80-5.20)  Mil/uL


 


Hgb  12.0   (11.0-16.0)  g/dL


 


Hct  36.9   (34.0-47.0)  %


 


MCV  85.9   (81.0-99.0)  fL


 


MCH  27.9   (27.0-31.0)  pg


 


MCHC  32.5 L   (33.0-37.0)  g/dL


 


RDW  15.2 H   (11.5-14.5)  %


 


Plt Count  253   (130-400)  K/uL


 


MPV  8.0   (7.2-11.7)  fL


 


Neut % (Auto)  58.6   (50.0-75.0)  %


 


Lymph % (Auto)  31.7   (20.0-40.0)  %


 


Mono % (Auto)  7.8   (0.0-10.0)  %


 


Eos % (Auto)  1.2   (0.0-4.0)  %


 


Baso % (Auto)  0.7   (0.0-2.0)  %


 


Neut # (Auto)  3.5   (1.8-7.0)  K/uL


 


Lymph # (Auto)  1.9   (1.0-4.3)  K/uL


 


Mono # (Auto)  0.5   (0.0-0.8)  K/uL


 


Eos # (Auto)  0.1   (0.0-0.7)  K/uL


 


Baso # (Auto)  0.0   (0.0-0.2)  K/uL


 


Sodium    (132-148)  mmol/L


 


Potassium    (3.6-5.2)  mmol/L


 


Chloride    ()  mmol/L


 


Carbon Dioxide    (22-30)  mmol/L


 


Anion Gap    (10-20)  


 


BUN    (7-17)  mg/dL


 


Creatinine    (0.7-1.2)  mg/dL


 


Est GFR (African Amer)    


 


Est GFR (Non-Af Amer)    


 


Random Glucose    ()  mg/dL


 


Hemoglobin A1c    (4.2-6.5)  %


 


Calcium    (8.6-10.4)  mg/dl


 


Phosphorus    (2.5-4.5)  mg/dL


 


Magnesium    (1.6-2.3)  mg/dL


 


Total Bilirubin    (0.2-1.3)  mg/dL


 


AST    (14-36)  U/L


 


ALT    (9-52)  U/L


 


Alkaline Phosphatase    ()  U/L


 


Total Protein    (6.3-8.3)  g/dL


 


Albumin    (3.5-5.0)  g/dL


 


Globulin    (2.2-3.9)  gm/dL


 


Albumin/Globulin Ratio    (1.0-2.1)  


 


Free T4    (0.78-2.19)  ng/dL


 


Free T3 pg/mL    (2.77-5.27)  pg/mL


 


TSH 3rd Generation    (0.46-4.68)  mIU/L


 


Urine Color   Yellow  (YELLOW)  


 


Urine Clarity   Clear  (Clear)  


 


Urine pH   5.0  (5.0-8.0)  


 


Ur Specific Gravity   1.009  (1.003-1.030)  


 


Urine Protein   Negative  (NEGATIVE)  mg/dL


 


Urine Glucose (UA)   Normal  (Normal)  mg/dL


 


Urine Ketones   Negative  (NEGATIVE)  mg/dL


 


Urine Blood   Negative  (NEGATIVE)  


 


Urine Nitrate   Positive H  (NEGATIVE)  


 


Urine Bilirubin   Negative  (NEGATIVE)  


 


Urine Urobilinogen   Normal  (0.2-1.0)  mg/dL


 


Ur Leukocyte Esterase   Trace  (Negative)  Juan/uL


 


Urine WBC (Auto)   3  (0-5)  /hpf


 


Urine RBC (Auto)   1  (0-3)  /hpf


 


Ur Squamous Epith Cells   < 1  (0-5)  /hpf


 


Urine Bacteria   Occ H  (<OCC)  








                         Laboratory Results - last 24 hr











  03/13/19 03/14/19 03/14/19





  10:12 06:19 06:19


 


WBC   5.9  D 


 


RBC   4.30 


 


Hgb   12.0 


 


Hct   36.9 


 


MCV   85.9 


 


MCH   27.9 


 


MCHC   32.5 L 


 


RDW   15.2 H 


 


Plt Count   253 


 


MPV   8.0 


 


Neut % (Auto)   58.6 


 


Lymph % (Auto)   31.7 


 


Mono % (Auto)   7.8 


 


Eos % (Auto)   1.2 


 


Baso % (Auto)   0.7 


 


Neut # (Auto)   3.5 


 


Lymph # (Auto)   1.9 


 


Mono # (Auto)   0.5 


 


Eos # (Auto)   0.1 


 


Baso # (Auto)   0.0 


 


Sodium    137


 


Potassium    3.6


 


Chloride    106


 


Carbon Dioxide    27


 


Anion Gap    7 L


 


BUN    11


 


Creatinine    0.7


 


Est GFR ( Amer)    > 60


 


Est GFR (Non-Af Amer)    > 60


 


Random Glucose    106 H D


 


Hemoglobin A1c   


 


Calcium    9.2


 


Phosphorus    4.3


 


Magnesium    2.0


 


Total Bilirubin    0.9


 


AST    36


 


ALT    24


 


Alkaline Phosphatase    134 H D


 


Total Protein    7.3


 


Albumin    3.7


 


Globulin    3.6


 


Albumin/Globulin Ratio    1.0


 


Free T4   


 


Free T3 pg/mL    3.33


 


TSH 3rd Generation    0.11 L


 


Urine Color  Yellow  


 


Urine Clarity  Clear  


 


Urine pH  5.0  


 


Ur Specific Gravity  1.009  


 


Urine Protein  Negative  


 


Urine Glucose (UA)  Normal  


 


Urine Ketones  Negative  


 


Urine Blood  Negative  


 


Urine Nitrate  Positive H  


 


Urine Bilirubin  Negative  


 


Urine Urobilinogen  Normal  


 


Ur Leukocyte Esterase  Trace  


 


Urine WBC (Auto)  3  


 


Urine RBC (Auto)  1  


 


Ur Squamous Epith Cells  < 1  


 


Urine Bacteria  Occ H  














  03/14/19 03/14/19





  06:19 06:19


 


WBC  


 


RBC  


 


Hgb  


 


Hct  


 


MCV  


 


MCH  


 


MCHC  


 


RDW  


 


Plt Count  


 


MPV  


 


Neut % (Auto)  


 


Lymph % (Auto)  


 


Mono % (Auto)  


 


Eos % (Auto)  


 


Baso % (Auto)  


 


Neut # (Auto)  


 


Lymph # (Auto)  


 


Mono # (Auto)  


 


Eos # (Auto)  


 


Baso # (Auto)  


 


Sodium  


 


Potassium  


 


Chloride  


 


Carbon Dioxide  


 


Anion Gap  


 


BUN  


 


Creatinine  


 


Est GFR ( Amer)  


 


Est GFR (Non-Af Amer)  


 


Random Glucose  


 


Hemoglobin A1c   6.4


 


Calcium  


 


Phosphorus  


 


Magnesium  


 


Total Bilirubin  


 


AST  


 


ALT  


 


Alkaline Phosphatase  


 


Total Protein  


 


Albumin  


 


Globulin  


 


Albumin/Globulin Ratio  


 


Free T4  1.56 


 


Free T3 pg/mL  


 


TSH 3rd Generation  


 


Urine Color  


 


Urine Clarity  


 


Urine pH  


 


Ur Specific Gravity  


 


Urine Protein  


 


Urine Glucose (UA)  


 


Urine Ketones  


 


Urine Blood  


 


Urine Nitrate  


 


Urine Bilirubin  


 


Urine Urobilinogen  


 


Ur Leukocyte Esterase  


 


Urine WBC (Auto)  


 


Urine RBC (Auto)  


 


Ur Squamous Epith Cells  


 


Urine Bacteria  











Radiology Impressions: 


                              Radiology Impressions





Chest X-Ray  03/13/19 05:58


IMPRESSION:


No focal consolidation. 


 


 








Head CT  03/13/19 13:00


IMPRESSION:


Interval slight increased the size of the acute subdural hematoma 


along the right tentorial leaflet since the prior exam.  Otherwise no 


significant interval changes


 


 











Fingerstick Blood Sugar Results: 108





Review of Systems





- Review of Systems


Systems not reviewed;Unavailable: Altered Mental Status





Critical Care Progress Note





- Nutrition


Nutrition: 


                                    Nutrition











 Category Date Time Status


 


 Diabetic [Consistent Carbohydrate] [DIET] Diets  03/13/19 Lunch Active














Assessment/Plan





- Assessment and Plan (Free Text)


Assessment: 


68 y o female with PMhx hypothyroidism, dementia, HLD, DM2 who presented to the 

ED brought in by police after being found outside near a tree. Reason for ICU 

consult was for subdural hematoma. Currently being monitored in ICU.


Plan: 


Neuro:


-AAOx2


-CT head on admission: acute subdural hematoma along R tentorial leaflet with 

maximum thickness of 0.2 cm.


-Repeat head CT: interval slight increased the size of the acute subdural 

hematoma along the R tentorial leaflet since the prior exam. Otherwise no 

significant interval changes


-Repeat head CT this am: stable subdural hematoma along R tentorial leaflet


-Neurosurgery consulted (Dr. Pierce), recs appreciated


   No surgical intervention recommended at this time


-Neurology consulted (Dr. Zhang), recs appreciated


-Hx dementia, cont w/ home med Donepezil





Cardio: 


-HR and BP wnl, pt asymptomatic, cont to monitor


-C/w home med Lisinopril daily


-C/w home med statin therapy


-EKG on admission demonstrates NSR, possible LVH, prolonged QT


-CXR on admission: no focal consolidation





GI:


-Diabetic diet


-Protonix daily


-No acute issues at this time





Renal:


-BUN/Cr 11/0.7, cont to monitor


-Trend I's/O's





ID:


-Leukocytosis resolved


-Afebrile, no tachycardia


-Cont to trend, no anbx indicated at this time


-Blood cx NGTD, urine cx pending


-CXR neg for consolidation





Endo:


-Hx DM2


   Cont with home med Metformin


   ISS-med


   Fingersticks achs


   Hypoglycemic protocol


   A1c 6.4


-Hx hypothyroidism s/p thyroidectomy


   C/w home med Synthroid


   TSH 0.11 low, free T3 and T4 wnl, possible subclinical hypothyroidism, may 

need repeat studies outpatient





PPX:


-Protonix


-SCD, pharmacologic DVT ppx contraindicated 2/2 cranial bleed


-OT/PT evals ordered





Dispo: Will continue to monitor in ICU. If pt continues to be stable, can be 

downgraded from ICU to telemetry floor. 





Pt seen, examined with, and plan discussed with Dr. Shepherd, attending physician.





Alverto Rodarte DO PGY-1, Family Medicine Resident 


Pager #[phone]

## 2019-03-14 NOTE — CP.PCM.CON
History of Present Illness





- History of Present Illness


History of Present Illness: 


Consult note for Dr. Zhang.





68 year old female with PMHx of DM, HLD, dementia, syphilis (treated one year 

ago), and hypoparathyroidism who was found confused, wandering outside with an 

abrasion to her R face and was subsequently brought to ED yesterday by EMS and 

police. History was obtained via chart review and family as patient has 

dementia. Patient lives with family and was noticed to not be in her room 

sometime between 4-5am yesterday, patient arrived to ED at approximately 5:54am.

As per family, patient has an unsteady gait at baseline and experiences falls 3x

per month. Initial CT head showed acute subdural hematoma at the R tentoral 

leaflet with a maximum thickness of 0.2cm, mild volume loss and chronic 

microvascular changes. Repeat head CT 6 hours later showed slight increase in 

size of the SDH. Neurosurgery was consulted and determined no surgical 

intervention was indicated. Repeat CT head this morning shows stable SDH. Today,

patient only complains of abrasion to R nose and orbit and b/l knees. Denies 

dizziness, generalized headache, focal weakness or numbness, slurred speech, 

change in vision, nausea, vomiting, and any other symptoms. 





PMhx: hypoparathyroidism, hypothyroid, dementia, syphilis (treated with PNC and 

doxycycline in 2018), HLD, DM2


PSurgHx: parathyroidectomy (2018)


Allergies: NKDA as per family member


Fam hx: Mother had DM


Soc hx: denies smoking, EtOH or illicit drug use, lives with family








Review of Systems





- Review of Systems


All systems: reviewed and no additional remarkable complaints except (as per 

HPI)





Past Patient History





- Infectious Disease


Hx of Infectious Diseases: None





- Past Medical History & Family History


Past Medical History?: Yes





- Past Social History


Smoking Status: Unknown If Ever Smoked





- CARDIAC


Hx Hypercholesterolemia: Yes





- PULMONARY


Hx Respiratory Disorders: No





- NEUROLOGICAL


Hx Neurological Disorder: Yes


Hx Alzheimer's Disease: Yes


Hx Dementia: Yes





- HEENT


Hx HEENT Problems: No





- RENAL


Hx Chronic Kidney Disease: No





- ENDOCRINE/METABOLIC


Hx Endocrine Disorders: Yes


Hx Diabetes Mellitus Type 2: Yes


Hx Hypothyroidism: Yes





- HEMATOLOGICAL/ONCOLOGICAL


Hx Blood Disorders: No





- INTEGUMENTARY


Hx Dermatological Problems: No





- MUSCULOSKELETAL/RHEUMATOLOGICAL


Hx Musculoskeletal Disorders: No


Hx Falls: Yes


Hx Unsteady Gait: Yes





- GASTROINTESTINAL


Hx Gastrointestinal Disorders: No





- GENITOURINARY/GYNECOLOGICAL


Hx Genitourinary Disorders: No





- PSYCHIATRIC


Hx Psychophysiologic Disorder: No


Hx Substance Use: No





- SURGICAL HISTORY


Hx Surgeries: Yes


Hx Parathyroidectomy: Yes





- ANESTHESIA


Hx Anesthesia: Yes


Hx Anesthesia Reactions: No


Hx Malignant Hyperthermia: No


Has any member of the family had a problem w/ anesthesia?: No





Meds


Allergies/Adverse Reactions: 


                                    Allergies











Allergy/AdvReac Type Severity Reaction Status Date / Time


 


Penicillins Allergy   Verified 03/13/19 06:40














- Medications


Medications: 


                               Current Medications





Donepezil HCl (Aricept)  10 mg PO HS [facility]


   Last Admin: 03/13/19 22:22 Dose:  10 mg


Insulin Human Regular (Novolin R)  0 unit SC [facility]; Protocol


   Last Admin: 03/14/19 08:25 Dose:  Not Given


Levothyroxine Sodium (Synthroid)  125 mcg PO DAILY@0630 [facility]


   Last Admin: 03/14/19 08:25 Dose:  125 mcg


Lisinopril (Zestril)  2.5 mg PO DAILY [facility]


   Last Admin: 03/14/19 10:00 Dose:  2.5 mg


Metformin HCl (Glucophage)  500 mg PO DIN [facility]


Pantoprazole Sodium (Protonix Ec Tab)  40 mg PO DAILY [facility]


   Last Admin: 03/14/19 10:00 Dose:  40 mg


Rosuvastatin Calcium (Crestor)  20 mg PO [facility]


   Last Admin: 03/13/19 22:22 Dose:  20 mg











Physical Exam





- Head Exam


Additional comments: 


Swelling and abrasion to R orbit and nose.








- Eye Exam


Eye Exam: EOMI, Normal appearance, PERRL


Pupil Exam: NORMAL ACCOMODATION





- ENT Exam


ENT Exam: Mucous Membranes Moist





- Neck Exam


Neck exam: Positive for: Normal Inspection





- Respiratory Exam


Respiratory Exam: NORMAL BREATHING PATTERN.  absent: Respiratory Distress





- Cardiovascular Exam


Cardiovascular Exam: REGULAR RHYTHM, +S1, +S2





- GI/Abdominal Exam


GI & Abdominal Exam: Soft.  absent: Guarding, Rebound, Tenderness





- Extremities Exam


Extremities exam: Positive for: pedal pulses present.  Negative for: calf 

tenderness, normal inspection (abrasions to b/l knees)


Additional comments: 


moves all extremities.





- Neurological Exam


Additional comments: 


Awake, alert, oriented to person and place. EOMI. Speech is not slurred. CN2-12 

intact. Sensation intact, Muscle strength 5/5 in all extremities, toes 

downgoing, finger to nose normal. 








- Psychiatric Exam


Psychiatric exam: Normal Affect, Normal Mood





- Skin


Skin Exam: Abrasion (to face and knees, otherwise normal)





Results





- Vital Signs


Recent Vital Signs: 


                                Last Vital Signs











Temp  98.1 F   03/14/19 08:00


 


Pulse  57 L  03/14/19 08:00


 


Resp  17   03/14/19 08:00


 


BP  128/48 L  03/14/19 07:57


 


Pulse Ox  100   03/14/19 08:00














- Labs


Result Diagrams: 


                                 03/14/19 06:19





                                 03/14/19 06:19


Labs: 


                         Laboratory Results - last 24 hr











  03/14/19 03/14/19 03/14/19





  06:19 06:19 06:19


 


WBC  5.9  D  


 


RBC  4.30  


 


Hgb  12.0  


 


Hct  36.9  


 


MCV  85.9  


 


MCH  27.9  


 


MCHC  32.5 L  


 


RDW  15.2 H  


 


Plt Count  253  


 


MPV  8.0  


 


Neut % (Auto)  58.6  


 


Lymph % (Auto)  31.7  


 


Mono % (Auto)  7.8  


 


Eos % (Auto)  1.2  


 


Baso % (Auto)  0.7  


 


Neut # (Auto)  3.5  


 


Lymph # (Auto)  1.9  


 


Mono # (Auto)  0.5  


 


Eos # (Auto)  0.1  


 


Baso # (Auto)  0.0  


 


Sodium   137 


 


Potassium   3.6 


 


Chloride   106 


 


Carbon Dioxide   27 


 


Anion Gap   7 L 


 


BUN   11 


 


Creatinine   0.7 


 


Est GFR ( Amer)   > 60 


 


Est GFR (Non-Af Amer)   > 60 


 


Random Glucose   106 H D 


 


Hemoglobin A1c   


 


Calcium   9.2 


 


Phosphorus   4.3 


 


Magnesium   2.0 


 


Total Bilirubin   0.9 


 


AST   36 


 


ALT   24 


 


Alkaline Phosphatase   134 H D 


 


Total Protein   7.3 


 


Albumin   3.7 


 


Globulin   3.6 


 


Albumin/Globulin Ratio   1.0 


 


Free T4    1.56


 


Free T3 pg/mL   3.33 


 


TSH 3rd Generation   0.11 L 














  03/14/19





  06:19


 


WBC 


 


RBC 


 


Hgb 


 


Hct 


 


MCV 


 


MCH 


 


MCHC 


 


RDW 


 


Plt Count 


 


MPV 


 


Neut % (Auto) 


 


Lymph % (Auto) 


 


Mono % (Auto) 


 


Eos % (Auto) 


 


Baso % (Auto) 


 


Neut # (Auto) 


 


Lymph # (Auto) 


 


Mono # (Auto) 


 


Eos # (Auto) 


 


Baso # (Auto) 


 


Sodium 


 


Potassium 


 


Chloride 


 


Carbon Dioxide 


 


Anion Gap 


 


BUN 


 


Creatinine 


 


Est GFR ( Amer) 


 


Est GFR (Non-Af Amer) 


 


Random Glucose 


 


Hemoglobin A1c  6.4


 


Calcium 


 


Phosphorus 


 


Magnesium 


 


Total Bilirubin 


 


AST 


 


ALT 


 


Alkaline Phosphatase 


 


Total Protein 


 


Albumin 


 


Globulin 


 


Albumin/Globulin Ratio 


 


Free T4 


 


Free T3 pg/mL 


 


TSH 3rd Generation 














Assessment & Plan





- Assessment and Plan (Free Text)


Assessment: 





68 year old female with head trauma and SDH on CT


Plan: 


CT head: acute subdural hematoma at the R tentoral leaflet with a maximum 

thickness of 0.2cm, mild volume loss and chronic microvascular changes. 


Repeat CT Head 3/13: slight increase in size of the SDH. Neurosurgery was 

consulted and determined no surgical intervention was indicated. Repeat CT head 

this morning shows stable SDH.


Repeat CT Head 3/14: stable SDH along R tenoral leaflet





-Patient cleared to downgrade from ICU from neurological standpoint.





Discussed with Dr. Leatha Mccartney, PGY-1

## 2019-03-14 NOTE — CP.PCM.PN
Subjective





- Date & Time of Evaluation


Date of Evaluation: 03/14/19


Time of Evaluation: 13:45





- Subjective


Subjective: 


clinically same





Objective





- Vital Signs/Intake and Output


Vital Signs (last 24 hours): 


                                        











Temp Pulse Resp BP Pulse Ox


 


 98.4 F   69   22   99/44 L  95 


 


 03/14/19 16:00  03/14/19 15:00  03/14/19 15:00  03/14/19 14:57  03/14/19 16:00








Intake and Output: 


                                        











 03/14/19 03/14/19





 06:59 18:59


 


Intake Total 400 660


 


Output Total 350 450


 


Balance 50 210














- Medications


Medications: 


                               Current Medications





Bacitracin (Bacitracin)  1 ea TOP Q4H PRN


   PRN Reason: Dry skin


   Last Admin: 03/14/19 17:37 Dose:  1 ea


Donepezil HCl (Aricept)  10 mg PO [facility]


   Last Admin: 03/13/19 22:22 Dose:  10 mg


Insulin Human Regular (Novolin R)  0 unit SC [facility]; Protocol


   Last Admin: 03/14/19 16:30 Dose:  Not Given


Levothyroxine Sodium (Synthroid)  125 mcg PO DAILY@0630 [facility]


   Last Admin: 03/14/19 08:25 Dose:  125 mcg


Lisinopril (Zestril)  2.5 mg PO DAILY [facility]


   Last Admin: 03/14/19 10:00 Dose:  2.5 mg


Metformin HCl (Glucophage)  500 mg PO DAILY@1700 [facility]


   Last Admin: 03/14/19 17:45 Dose:  500 mg


Pantoprazole Sodium (Protonix Ec Tab)  40 mg PO DAILY [facility]


   Last Admin: 03/14/19 10:00 Dose:  40 mg


Rosuvastatin Calcium (Crestor)  20 mg PO [facility]


   Last Admin: 03/13/19 22:22 Dose:  20 mg











- Labs


Labs: 


                                        





                                 03/14/19 06:19 





                                 03/14/19 06:19 





                                        











PT  11.5 SECONDS (9.7-12.2)   03/13/19  06:22    


 


INR  1.1   03/13/19  06:22    


 


APTT  27 SECONDS (21-34)   03/13/19  06:22    














- Constitutional


Appears: Well





- Head Exam


Head Exam: ATRAUMATIC, NORMAL INSPECTION, NORMOCEPHALIC





- Eye Exam


Eye Exam: EOMI, Normal appearance, PERRL


Pupil Exam: NORMAL ACCOMODATION, PERRL





- ENT Exam


ENT Exam: Mucous Membranes Moist, Normal Exam





- Neck Exam


Neck Exam: Full ROM, Normal Inspection.  absent: Lymphadenopathy





- Respiratory Exam


Respiratory Exam: Decreased Breath Sounds





- Cardiovascular Exam


Cardiovascular Exam: REGULAR RHYTHM, +S1, +S2





- GI/Abdominal Exam


GI & Abdominal Exam: Soft, Diminished Bowel Sounds





- Rectal Exam


Rectal Exam: Deferred

## 2019-03-15 VITALS — RESPIRATION RATE: 20 BRPM

## 2019-03-15 LAB
ALBUMIN SERPL-MCNC: 2.9 G/DL (ref 3.5–5)
ALBUMIN/GLOB SERPL: 1 {RATIO} (ref 1–2.1)
ALT SERPL-CCNC: 14 U/L (ref 9–52)
AST SERPL-CCNC: 28 U/L (ref 14–36)
BASOPHILS # BLD AUTO: 0 K/UL (ref 0–0.2)
BASOPHILS NFR BLD: 0.6 % (ref 0–2)
BUN SERPL-MCNC: 13 MG/DL (ref 7–17)
CALCIUM SERPL-MCNC: 7.6 MG/DL (ref 8.6–10.4)
EOSINOPHIL # BLD AUTO: 0.1 K/UL (ref 0–0.7)
EOSINOPHIL NFR BLD: 2.4 % (ref 0–4)
ERYTHROCYTE [DISTWIDTH] IN BLOOD BY AUTOMATED COUNT: 15.9 % (ref 11.5–14.5)
GFR NON-AFRICAN AMERICAN: > 60
HGB BLD-MCNC: 11.9 G/DL (ref 11–16)
LYMPHOCYTES # BLD AUTO: 1.8 K/UL (ref 1–4.3)
LYMPHOCYTES NFR BLD AUTO: 29.7 % (ref 20–40)
MCH RBC QN AUTO: 27.8 PG (ref 27–31)
MCHC RBC AUTO-ENTMCNC: 32 G/DL (ref 33–37)
MCV RBC AUTO: 87 FL (ref 81–99)
MONOCYTES # BLD: 0.5 K/UL (ref 0–0.8)
MONOCYTES NFR BLD: 7.9 % (ref 0–10)
NEUTROPHILS # BLD: 3.6 K/UL (ref 1.8–7)
NEUTROPHILS NFR BLD AUTO: 59.4 % (ref 50–75)
NRBC BLD AUTO-RTO: 0.1 % (ref 0–2)
PLATELET # BLD: 265 K/UL (ref 130–400)
PMV BLD AUTO: 8.3 FL (ref 7.2–11.7)
RBC # BLD AUTO: 4.28 MIL/UL (ref 3.8–5.2)
WBC # BLD AUTO: 6.1 K/UL (ref 4.8–10.8)

## 2019-03-15 RX ADMIN — MOXIFLOXACIN HYDROCHLORIDE SCH MLS/HR: 400 INJECTION, SOLUTION INTRAVENOUS at 10:00

## 2019-03-15 RX ADMIN — HUMAN INSULIN SCH: 100 INJECTION, SOLUTION SUBCUTANEOUS at 12:30

## 2019-03-15 RX ADMIN — HUMAN INSULIN SCH: 100 INJECTION, SOLUTION SUBCUTANEOUS at 17:18

## 2019-03-15 RX ADMIN — HUMAN INSULIN SCH: 100 INJECTION, SOLUTION SUBCUTANEOUS at 07:30

## 2019-03-15 RX ADMIN — HUMAN INSULIN SCH: 100 INJECTION, SOLUTION SUBCUTANEOUS at 21:08

## 2019-03-15 RX ADMIN — PANTOPRAZOLE SODIUM SCH MG: 40 TABLET, DELAYED RELEASE ORAL at 09:15

## 2019-03-15 NOTE — CP.PCM.PN
Subjective





- Date & Time of Evaluation


Date of Evaluation: 03/15/19


Time of Evaluation: 08:00





- Subjective


Subjective: 


clinically same





Objective





- Vital Signs/Intake and Output


Vital Signs (last 24 hours): 


                                        











Temp Pulse Resp BP Pulse Ox


 


 98.1 F   69   20   102/66   96 


 


 03/15/19 15:00  03/15/19 15:00  03/15/19 15:00  03/15/19 15:00  03/15/19 15:00











- Medications


Medications: 


                               Current Medications





Bacitracin (Bacitracin)  1 ea TOP Q4H PRN


   PRN Reason: Dry skin


   Last Admin: 03/14/19 17:37 Dose:  1 ea


Donepezil HCl (Aricept)  10 mg PO HS [facility]


   Last Admin: 03/14/19 22:16 Dose:  10 mg


Moxifloxacin HCl (Avelox Iv 400mg/250ml Ns)  400 mg in 250 mls @ 167 mls/hr IVPB

Q24H [facility]; Protocol


   Last Admin: 03/15/19 10:00 Dose:  167 mls/hr


Insulin Human Regular (Novolin R)  0 unit SC [facility]; Protocol


   Last Admin: 03/15/19 17:18 Dose:  Not Given


Levothyroxine Sodium (Synthroid)  125 mcg PO DAILY@0630 [facility]


   Last Admin: 03/15/19 06:35 Dose:  125 mcg


Lisinopril (Zestril)  2.5 mg PO DAILY [facility]


   Last Admin: 03/15/19 09:15 Dose:  2.5 mg


Metformin HCl (Glucophage)  500 mg PO DAILY@1700 [facility]


   Last Admin: 03/15/19 17:18 Dose:  Not Given


Pantoprazole Sodium (Protonix Ec Tab)  40 mg PO DAILY [facility]


   Last Admin: 03/15/19 09:15 Dose:  40 mg


Rosuvastatin Calcium (Crestor)  20 mg PO HS [facility]


   Last Admin: 03/14/19 22:16 Dose:  20 mg











- Labs


Labs: 


                                        





                                 03/15/19 06:10 





                                 03/15/19 06:10 





                                        











PT  11.5 SECONDS (9.7-12.2)   03/13/19  06:22    


 


INR  1.1   03/13/19  06:22    


 


APTT  27 SECONDS (21-34)   03/13/19  06:22    














- Constitutional


Appears: Well





- Head Exam


Head Exam: ATRAUMATIC, NORMAL INSPECTION, NORMOCEPHALIC





- Eye Exam


Eye Exam: EOMI, Normal appearance, PERRL


Pupil Exam: NORMAL ACCOMODATION, PERRL





- ENT Exam


ENT Exam: Mucous Membranes Moist, Normal Exam





- Neck Exam


Neck Exam: Full ROM, Normal Inspection.  absent: Lymphadenopathy





- Respiratory Exam


Respiratory Exam: Decreased Breath Sounds





- Cardiovascular Exam


Cardiovascular Exam: REGULAR RHYTHM, +S1, +S2





- GI/Abdominal Exam


GI & Abdominal Exam: Soft, Diminished Bowel Sounds





- Rectal Exam


Rectal Exam: Deferred

## 2019-03-16 LAB
ALBUMIN SERPL-MCNC: 3.4 G/DL (ref 3.5–5)
ALBUMIN/GLOB SERPL: 1 {RATIO} (ref 1–2.1)
ALT SERPL-CCNC: 14 U/L (ref 9–52)
AST SERPL-CCNC: 26 U/L (ref 14–36)
BASOPHILS # BLD AUTO: 0 K/UL (ref 0–0.2)
BASOPHILS NFR BLD: 0.6 % (ref 0–2)
BUN SERPL-MCNC: 17 MG/DL (ref 7–17)
CALCIUM SERPL-MCNC: 9.4 MG/DL (ref 8.6–10.4)
EOSINOPHIL # BLD AUTO: 0.2 K/UL (ref 0–0.7)
EOSINOPHIL NFR BLD: 3.2 % (ref 0–4)
ERYTHROCYTE [DISTWIDTH] IN BLOOD BY AUTOMATED COUNT: 15.4 % (ref 11.5–14.5)
GFR NON-AFRICAN AMERICAN: > 60
HGB BLD-MCNC: 11.9 G/DL (ref 11–16)
LYMPHOCYTES # BLD AUTO: 1.7 K/UL (ref 1–4.3)
LYMPHOCYTES NFR BLD AUTO: 28.8 % (ref 20–40)
MCH RBC QN AUTO: 28.4 PG (ref 27–31)
MCHC RBC AUTO-ENTMCNC: 33.5 G/DL (ref 33–37)
MCV RBC AUTO: 85 FL (ref 81–99)
MONOCYTES # BLD: 0.5 K/UL (ref 0–0.8)
MONOCYTES NFR BLD: 8.9 % (ref 0–10)
NEUTROPHILS # BLD: 3.4 K/UL (ref 1.8–7)
NEUTROPHILS NFR BLD AUTO: 58.5 % (ref 50–75)
NRBC BLD AUTO-RTO: 0 % (ref 0–2)
PLATELET # BLD: 269 K/UL (ref 130–400)
PMV BLD AUTO: 8 FL (ref 7.2–11.7)
RBC # BLD AUTO: 4.19 MIL/UL (ref 3.8–5.2)
WBC # BLD AUTO: 5.8 K/UL (ref 4.8–10.8)

## 2019-03-16 RX ADMIN — HUMAN INSULIN SCH: 100 INJECTION, SOLUTION SUBCUTANEOUS at 07:59

## 2019-03-16 RX ADMIN — HUMAN INSULIN SCH: 100 INJECTION, SOLUTION SUBCUTANEOUS at 11:47

## 2019-03-16 RX ADMIN — HUMAN INSULIN SCH: 100 INJECTION, SOLUTION SUBCUTANEOUS at 21:45

## 2019-03-16 RX ADMIN — BACITRACIN ZINC PRN EA: 500 OINTMENT TOPICAL at 10:41

## 2019-03-16 RX ADMIN — HUMAN INSULIN SCH: 100 INJECTION, SOLUTION SUBCUTANEOUS at 17:23

## 2019-03-16 RX ADMIN — MOXIFLOXACIN HYDROCHLORIDE SCH MLS/HR: 400 INJECTION, SOLUTION INTRAVENOUS at 10:48

## 2019-03-16 RX ADMIN — PANTOPRAZOLE SODIUM SCH MG: 40 TABLET, DELAYED RELEASE ORAL at 10:42

## 2019-03-16 NOTE — CP.PCM.PN
Subjective





- Date & Time of Evaluation


Date of Evaluation: 03/16/19


Time of Evaluation: 07:45





- Subjective


Subjective: 


clinically same





Objective





- Vital Signs/Intake and Output


Vital Signs (last 24 hours): 


                                        











Temp Pulse Resp BP Pulse Ox


 


 97.6 F   68   20   114/72   97 


 


 03/16/19 16:10  03/16/19 16:10  03/16/19 16:10  03/16/19 16:10  03/16/19 16:10








Intake and Output: 


                                        











 03/16/19 03/16/19





 06:59 18:59


 


Intake Total  1500


 


Balance  1500














- Medications


Medications: 


                               Current Medications





Bacitracin (Bacitracin)  1 ea TOP Q4H PRN


   PRN Reason: Dry skin


   Last Admin: 03/16/19 10:41 Dose:  1 ea


Donepezil HCl (Aricept)  10 mg PO HS [facility]


   Last Admin: 03/15/19 21:42 Dose:  10 mg


Moxifloxacin HCl (Avelox Iv 400mg/250ml Ns)  400 mg in 250 mls @ 167 mls/hr IVPB

Q24H [facility]; Protocol


   Last Admin: 03/16/19 10:48 Dose:  167 mls/hr


Insulin Human Regular (Novolin R)  0 unit SC [facility]; Protocol


   Last Admin: 03/16/19 17:23 Dose:  Not Given


Levothyroxine Sodium (Synthroid)  125 mcg PO DAILY@0630 [facility]


   Last Admin: 03/16/19 06:31 Dose:  125 mcg


Lisinopril (Zestril)  2.5 mg PO DAILY [facility]


   Last Admin: 03/16/19 10:42 Dose:  2.5 mg


Metformin HCl (Glucophage)  500 mg PO DAILY@1700 [facility]


   Last Admin: 03/16/19 17:23 Dose:  500 mg


Pantoprazole Sodium (Protonix Ec Tab)  40 mg PO DAILY [facility]


   Last Admin: 03/16/19 10:42 Dose:  40 mg


Rosuvastatin Calcium (Crestor)  20 mg PO HS [facility]


   Last Admin: 03/15/19 21:42 Dose:  20 mg











- Labs


Labs: 


                                        





                                 03/16/19 07:10 





                                 03/16/19 07:10 





                                        











PT  11.5 SECONDS (9.7-12.2)   03/13/19  06:22    


 


INR  1.1   03/13/19  06:22    


 


APTT  27 SECONDS (21-34)   03/13/19  06:22    














- Constitutional


Appears: Well





- Head Exam


Head Exam: ATRAUMATIC, NORMAL INSPECTION, NORMOCEPHALIC





- Eye Exam


Eye Exam: EOMI, Normal appearance, PERRL


Pupil Exam: NORMAL ACCOMODATION, PERRL





- ENT Exam


ENT Exam: Mucous Membranes Moist, Normal Exam





- Neck Exam


Neck Exam: Full ROM, Normal Inspection.  absent: Lymphadenopathy





- Respiratory Exam


Respiratory Exam: Decreased Breath Sounds





- Cardiovascular Exam


Cardiovascular Exam: REGULAR RHYTHM, +S1, +S2





- GI/Abdominal Exam


GI & Abdominal Exam: Soft, Diminished Bowel Sounds





- Rectal Exam


Rectal Exam: Deferred

## 2019-03-17 LAB
ALBUMIN SERPL-MCNC: 3.7 G/DL (ref 3.5–5)
ALBUMIN/GLOB SERPL: 1.1 {RATIO} (ref 1–2.1)
ALT SERPL-CCNC: 14 U/L (ref 9–52)
AST SERPL-CCNC: 24 U/L (ref 14–36)
BASOPHILS # BLD AUTO: 0 K/UL (ref 0–0.2)
BASOPHILS NFR BLD: 0.8 % (ref 0–2)
BUN SERPL-MCNC: 21 MG/DL (ref 7–17)
CALCIUM SERPL-MCNC: 9.4 MG/DL (ref 8.6–10.4)
EOSINOPHIL # BLD AUTO: 0.2 K/UL (ref 0–0.7)
EOSINOPHIL NFR BLD: 3.5 % (ref 0–4)
ERYTHROCYTE [DISTWIDTH] IN BLOOD BY AUTOMATED COUNT: 15.7 % (ref 11.5–14.5)
GFR NON-AFRICAN AMERICAN: 55
HGB BLD-MCNC: 12.3 G/DL (ref 11–16)
LYMPHOCYTES # BLD AUTO: 1.5 K/UL (ref 1–4.3)
LYMPHOCYTES NFR BLD AUTO: 29.2 % (ref 20–40)
MCH RBC QN AUTO: 28.7 PG (ref 27–31)
MCHC RBC AUTO-ENTMCNC: 33.5 G/DL (ref 33–37)
MCV RBC AUTO: 85.7 FL (ref 81–99)
MONOCYTES # BLD: 0.5 K/UL (ref 0–0.8)
MONOCYTES NFR BLD: 8.9 % (ref 0–10)
NEUTROPHILS # BLD: 3 K/UL (ref 1.8–7)
NEUTROPHILS NFR BLD AUTO: 57.6 % (ref 50–75)
NRBC BLD AUTO-RTO: 0.1 % (ref 0–2)
PLATELET # BLD: 298 K/UL (ref 130–400)
PMV BLD AUTO: 8 FL (ref 7.2–11.7)
RBC # BLD AUTO: 4.3 MIL/UL (ref 3.8–5.2)
WBC # BLD AUTO: 5.2 K/UL (ref 4.8–10.8)

## 2019-03-17 RX ADMIN — HUMAN INSULIN SCH: 100 INJECTION, SOLUTION SUBCUTANEOUS at 22:00

## 2019-03-17 RX ADMIN — HUMAN INSULIN SCH: 100 INJECTION, SOLUTION SUBCUTANEOUS at 11:50

## 2019-03-17 RX ADMIN — HUMAN INSULIN SCH: 100 INJECTION, SOLUTION SUBCUTANEOUS at 08:02

## 2019-03-17 RX ADMIN — PANTOPRAZOLE SODIUM SCH MG: 40 TABLET, DELAYED RELEASE ORAL at 09:29

## 2019-03-17 RX ADMIN — MOXIFLOXACIN HYDROCHLORIDE SCH MLS/HR: 400 INJECTION, SOLUTION INTRAVENOUS at 09:44

## 2019-03-17 RX ADMIN — HUMAN INSULIN SCH: 100 INJECTION, SOLUTION SUBCUTANEOUS at 18:00

## 2019-03-17 NOTE — CP.PCM.PN
Subjective





- Date & Time of Evaluation


Date of Evaluation: 03/17/19


Time of Evaluation: 07:45





- Subjective


Subjective: 


clinically same





Objective





- Vital Signs/Intake and Output


Vital Signs (last 24 hours): 


                                        











Temp Pulse Resp BP Pulse Ox


 


 98.0 F   71   20   120/73   96 


 


 03/17/19 08:28  03/17/19 08:28  03/17/19 08:28  03/17/19 08:28  03/17/19 08:28








Intake and Output: 


                                        











 03/17/19 03/17/19





 06:59 18:59


 


Intake Total  250


 


Balance  250














- Medications


Medications: 


                               Current Medications





Bacitracin (Bacitracin)  1 ea TOP Q4H PRN


   PRN Reason: Dry skin


   Last Admin: 03/16/19 10:41 Dose:  1 ea


Donepezil HCl (Aricept)  [facility]


   Last Admin: 03/16/19 21:45 Dose:  10 mg


Moxifloxacin HCl (Avelox Iv 400mg/250ml Ns)  400 mg in 250 mls @ 167 mls/hr IVPB

[facility]; [facility]


   Last Admin: 03/17/19 09:44 Dose:  167 mls/hr


Insulin Human Regular (Novolin R)  0 unit SC [facility]; Protocol


   Last Admin: 03/17/19 11:50 Dose:  Not Given


Levothyroxine Sodium (Synthroid)  125 mcg PO DAILY@0630 [facility]


   Last Admin: 03/17/19 05:31 Dose:  125 mcg


Lisinopril (Zestril)  2.5 mg PO DAILY [facility]


   Last Admin: 03/17/19 09:29 Dose:  2.5 mg


Metformin HCl (Glucophage)  500 mg PO DAILY@1700 [facility]


   Last Admin: 03/16/19 17:23 Dose:  500 mg


Pantoprazole Sodium (Protonix Ec Tab)  40 mg PO [facility]ILY [facility]


   Last Admin: 03/17/19 09:29 Dose:  40 mg


Rosuvastatin Calcium (Crestor)  20 mg PO HS [facility]


   Last Admin: 03/16/19 21:45 Dose:  20 mg











- Labs


Labs: 


                                        





                                 03/17/19 08:25 





                                 03/17/19 08:25 





                                        











PT  11.5 SECONDS (9.7-12.2)   03/13/19  06:22    


 


INR  1.1   03/13/19  06:22    


 


APTT  27 SECONDS (21-34)   03/13/19  06:22    














- Constitutional


Appears: Well





- Head Exam


Head Exam: ATRAUMATIC, NORMAL INSPECTION, NORMOCEPHALIC





- Eye Exam


Eye Exam: EOMI, Normal appearance, PERRL


Pupil Exam: NORMAL ACCOMODATION, PERRL





- ENT Exam


ENT Exam: Mucous Membranes Moist, Normal Exam





- Neck Exam


Neck Exam: Full ROM, Normal Inspection.  absent: Lymphadenopathy





- Respiratory Exam


Respiratory Exam: Decreased Breath Sounds





- Cardiovascular Exam


Cardiovascular Exam: REGULAR RHYTHM, +S1, +S2





- GI/Abdominal Exam


GI & Abdominal Exam: Soft, Diminished Bowel Sounds





- Rectal Exam


Rectal Exam: Deferred





-  Exam


Speculum exam: NORMAL SPECULUM EXAM

## 2019-03-18 LAB
ALBUMIN SERPL-MCNC: 3.5 G/DL (ref 3.5–5)
ALBUMIN/GLOB SERPL: 1.1 {RATIO} (ref 1–2.1)
ALT SERPL-CCNC: 21 U/L (ref 9–52)
AST SERPL-CCNC: 25 U/L (ref 14–36)
BASOPHILS # BLD AUTO: 0 K/UL (ref 0–0.2)
BASOPHILS NFR BLD: 0.6 % (ref 0–2)
BUN SERPL-MCNC: 20 MG/DL (ref 7–17)
CALCIUM SERPL-MCNC: 9.2 MG/DL (ref 8.6–10.4)
EOSINOPHIL # BLD AUTO: 0.2 K/UL (ref 0–0.7)
EOSINOPHIL NFR BLD: 2.9 % (ref 0–4)
ERYTHROCYTE [DISTWIDTH] IN BLOOD BY AUTOMATED COUNT: 15.5 % (ref 11.5–14.5)
GFR NON-AFRICAN AMERICAN: > 60
HGB BLD-MCNC: 11.9 G/DL (ref 11–16)
LYMPHOCYTES # BLD AUTO: 1.9 K/UL (ref 1–4.3)
LYMPHOCYTES NFR BLD AUTO: 29.3 % (ref 20–40)
MCH RBC QN AUTO: 28.6 PG (ref 27–31)
MCHC RBC AUTO-ENTMCNC: 33.1 G/DL (ref 33–37)
MCV RBC AUTO: 86.3 FL (ref 81–99)
MONOCYTES # BLD: 0.6 K/UL (ref 0–0.8)
MONOCYTES NFR BLD: 9.7 % (ref 0–10)
NEUTROPHILS # BLD: 3.7 K/UL (ref 1.8–7)
NEUTROPHILS NFR BLD AUTO: 57.5 % (ref 50–75)
NRBC BLD AUTO-RTO: 0.1 % (ref 0–2)
PLATELET # BLD: 279 K/UL (ref 130–400)
PMV BLD AUTO: 7.9 FL (ref 7.2–11.7)
RBC # BLD AUTO: 4.16 MIL/UL (ref 3.8–5.2)
WBC # BLD AUTO: 6.4 K/UL (ref 4.8–10.8)

## 2019-03-18 RX ADMIN — HUMAN INSULIN SCH: 100 INJECTION, SOLUTION SUBCUTANEOUS at 17:40

## 2019-03-18 RX ADMIN — MOXIFLOXACIN HYDROCHLORIDE SCH MLS/HR: 400 INJECTION, SOLUTION INTRAVENOUS at 11:01

## 2019-03-18 RX ADMIN — HUMAN INSULIN SCH: 100 INJECTION, SOLUTION SUBCUTANEOUS at 12:04

## 2019-03-18 RX ADMIN — HUMAN INSULIN SCH: 100 INJECTION, SOLUTION SUBCUTANEOUS at 21:36

## 2019-03-18 RX ADMIN — PANTOPRAZOLE SODIUM SCH MG: 40 TABLET, DELAYED RELEASE ORAL at 09:10

## 2019-03-18 RX ADMIN — HUMAN INSULIN SCH: 100 INJECTION, SOLUTION SUBCUTANEOUS at 08:15

## 2019-03-18 NOTE — CP.PCM.PN
Subjective





- Date & Time of Evaluation


Date of Evaluation: 03/18/19


Time of Evaluation: 07:30





- Subjective


Subjective: 


clinically same





Objective





- Vital Signs/Intake and Output


Vital Signs (last 24 hours): 


                                        











Temp Pulse Resp BP Pulse Ox


 


 98.1 F   63   20   121/70   96 


 


 03/18/19 15:00  03/18/19 15:00  03/18/19 15:00  03/18/19 15:00  03/18/19 15:00








Intake and Output: 


                                        











 03/18/19 03/19/19





 18:59 06:59


 


Intake Total 750 


 


Balance 750 














- Medications


Medications: 


                               Current Medications





Bacitracin (Bacitracin)  1 ea TOP Q4H PRN


   PRN Reason: Dry skin


   Last Admin: 03/16/19 10:41 Dose:  1 ea


Donepezil HCl (Aricept)  10 mg PO HS [facility]


   Last Admin: 03/17/19 21:15 Dose:  10 mg


Moxifloxacin HCl (Avelox Iv 400mg/250ml Ns)  400 mg in 250 mls @ 167 mls/hr IVPB

Q24H [facility]; Protocol


   Last Admin: 03/18/19 11:01 Dose:  167 mls/hr


Insulin Human Regular (Novolin R)  0 unit SC [facility]; Protocol


   Last Admin: 03/18/19 17:40 Dose:  Not Given


Levothyroxine Sodium (Synthroid)  125 mcg PO DAILY@0630 [facility]


   Last Admin: 03/18/19 05:41 Dose:  125 mcg


Lisinopril (Zestril)  2.5 mg PO DAILY [facility]


   Last Admin: 03/18/19 09:09 Dose:  2.5 mg


Metformin HCl (Glucophage)  500 mg PO DAILY@1700 [facility]


   Last Admin: 03/18/19 17:40 Dose:  500 mg


Pantoprazole Sodium (Protonix Ec Tab)  40 mg PO DAILY [facility]


   Last Admin: 03/18/19 09:10 Dose:  40 mg


Rosuvastatin Calcium (Crestor)  20 mg PO HS [facility]


   Last Admin: 03/17/19 21:15 Dose:  20 mg











- Labs


Labs: 


                                        





                                 03/18/19 07:15 





                                 03/18/19 07:15 





                                        











PT  11.5 SECONDS (9.7-12.2)   03/13/19  06:22    


 


INR  1.1   03/13/19  06:22    


 


APTT  27 SECONDS (21-34)   03/13/19  06:22    














- Constitutional


Appears: Well





- Head Exam


Head Exam: ATRAUMATIC, NORMAL INSPECTION, NORMOCEPHALIC





- Eye Exam


Eye Exam: EOMI, Normal appearance, PERRL


Pupil Exam: NORMAL ACCOMODATION, PERRL





- ENT Exam


ENT Exam: Mucous Membranes Moist, Normal Exam





- Neck Exam


Neck Exam: Full ROM, Normal Inspection.  absent: Lymphadenopathy





- Respiratory Exam


Respiratory Exam: Decreased Breath Sounds





- Cardiovascular Exam


Cardiovascular Exam: REGULAR RHYTHM, +S1, +S2





- GI/Abdominal Exam


GI & Abdominal Exam: Soft, Diminished Bowel Sounds





- Rectal Exam


Rectal Exam: Deferred

## 2019-03-19 VITALS
OXYGEN SATURATION: 97 % | HEART RATE: 63 BPM | DIASTOLIC BLOOD PRESSURE: 61 MMHG | SYSTOLIC BLOOD PRESSURE: 102 MMHG | TEMPERATURE: 97.1 F

## 2019-03-19 RX ADMIN — PANTOPRAZOLE SODIUM SCH MG: 40 TABLET, DELAYED RELEASE ORAL at 09:47

## 2019-03-19 RX ADMIN — HUMAN INSULIN SCH: 100 INJECTION, SOLUTION SUBCUTANEOUS at 07:26

## 2019-03-19 RX ADMIN — MOXIFLOXACIN HYDROCHLORIDE SCH MLS/HR: 400 INJECTION, SOLUTION INTRAVENOUS at 09:47

## 2019-03-19 RX ADMIN — HUMAN INSULIN SCH: 100 INJECTION, SOLUTION SUBCUTANEOUS at 11:16

## 2019-03-19 NOTE — CP.PCM.PN
Subjective





- Date & Time of Evaluation


Date of Evaluation: 03/19/19


Time of Evaluation: 11:00





- Subjective


Subjective: 





alert, awake, no sob or distress, NAD.





Objective





- Vital Signs/Intake and Output


Vital Signs (last 24 hours): 


                                        











Temp Pulse Resp BP Pulse Ox


 


 97.1 F L  63   20   102/61   97 


 


 03/19/19 07:00  03/19/19 07:00  03/19/19 07:00  03/19/19 07:00  03/19/19 07:00








Intake and Output: 


                                        











 03/19/19 03/19/19





 06:59 18:59


 


Intake Total 480 


 


Balance 480 














- Labs


Labs: 


                                        





                                 03/18/19 07:15 





                                 03/18/19 07:15 





                                        











PT  11.5 SECONDS (9.7-12.2)   03/13/19  06:22    


 


INR  1.1   03/13/19  06:22    


 


APTT  27 SECONDS (21-34)   03/13/19  06:22    














Assessment and Plan





- Assessment and Plan (Free Text)


Assessment: 





68 year old female admitted with subdural hematoma, seen and examined. Thad, 

orientedx3, cleared by neuro, discussed with DR SAMMY Moss, plan to discharge home 

with home care and home PT. Family at the bedside, advised to follow up with PMD

in 1 week.